# Patient Record
Sex: MALE | Race: WHITE | URBAN - METROPOLITAN AREA
[De-identification: names, ages, dates, MRNs, and addresses within clinical notes are randomized per-mention and may not be internally consistent; named-entity substitution may affect disease eponyms.]

---

## 2023-10-25 ENCOUNTER — TELEPHONE (OUTPATIENT)
Dept: CARDIOTHORACIC SURGERY | Age: 58
End: 2023-10-25

## 2024-03-08 ENCOUNTER — OFFICE VISIT (OUTPATIENT)
Dept: VASCULAR SURGERY | Age: 59
End: 2024-03-08
Payer: COMMERCIAL

## 2024-03-08 VITALS — WEIGHT: 160 LBS | HEIGHT: 68 IN | BODY MASS INDEX: 24.25 KG/M2

## 2024-03-08 DIAGNOSIS — I83.891 SYMPTOMATIC VARICOSE VEINS OF RIGHT LOWER EXTREMITY: Primary | ICD-10-CM

## 2024-03-08 PROCEDURE — G8484 FLU IMMUNIZE NO ADMIN: HCPCS | Performed by: SURGERY

## 2024-03-08 PROCEDURE — 1036F TOBACCO NON-USER: CPT | Performed by: SURGERY

## 2024-03-08 PROCEDURE — 3017F COLORECTAL CA SCREEN DOC REV: CPT | Performed by: SURGERY

## 2024-03-08 PROCEDURE — G8420 CALC BMI NORM PARAMETERS: HCPCS | Performed by: SURGERY

## 2024-03-08 PROCEDURE — 99203 OFFICE O/P NEW LOW 30 MIN: CPT | Performed by: SURGERY

## 2024-03-08 PROCEDURE — G8427 DOCREV CUR MEDS BY ELIG CLIN: HCPCS | Performed by: SURGERY

## 2024-03-08 RX ORDER — LEVOTHYROXINE SODIUM 50 MCG
50 TABLET ORAL DAILY
COMMUNITY

## 2024-03-08 RX ORDER — LAMOTRIGINE 250 MG/1
2 TABLET, EXTENDED RELEASE ORAL DAILY
COMMUNITY

## 2024-03-08 ASSESSMENT — ENCOUNTER SYMPTOMS
BACK PAIN: 1
RESPIRATORY NEGATIVE: 1
CONSTIPATION: 1
EYES NEGATIVE: 1

## 2024-03-08 NOTE — PROGRESS NOTES
Subjective:      Patient ID: Ronald Owens is a 59 y.o. male.    HPI Referral from Joseph Pham MD for evaluation of enlarging right calf varicose veins associated with a discomfort described as a pressure sensation worse with standing and activity.  Is improved with elevation.  Patient wears a leg sleeve during activities to improve symptoms.  Varicosities have been present and worsening over the last 5 to 7 years.  No previous venous interventions.  No history of SVT, bleeding, ulceration, dermatitis or swelling.    Past Medical History:   Diagnosis Date    Anemia, iron deficiency 3/26/2014    Hgb 13.4 (normal 13.5-17.5) on 10/30/13 labs. Subclinical, no sx.    Hypothyroidism 3/26/2014    TSH 5.37 (normal 0.35-5.5) on 10/30/13. Subclinical, no sx.    Nevus 10/30/2013     1 x 1.5 cm L scapula 10/30/13, noted 2010. Evenly pigmented, minor shadow 7:30 off of square shaped lesion.    Osteoporosis     Seizure (HCC)     Dr. Hudson An     Past Surgical History:   Procedure Laterality Date    PRE-MALIGNANT / BENIGN SKIN LESION EXCISION  3/15/02    abd and 2nd right toe    VASECTOMY  2000    WISDOM TOOTH EXTRACTION      20 years old     No Known Allergies  Current Outpatient Medications   Medication Sig Dispense Refill    Cholecalciferol ([facility] VIT D 5000 HIGH-POTENCY) 5000 UNITS CAPS capsule All patients should take 5000 IU or more Vitamin D every day for good general health. There are myriad benefits to higher Vitamin D levels. 90 capsule 12    Calcium Citrate-Vitamin D (CALCIUM CITRATE + D3 PO) Take 1,900 mg by mouth 2 times daily. Vit D 1500 IU      lamoTRIgine 250 MG TB24 Take 2 tablets by mouth daily      SYNTHROID 50 MCG tablet Take 1 tablet by mouth Daily       No current facility-administered medications for this visit.     Social History     Socioeconomic History    Marital status:      Spouse name: Isis Owens BSN, RN    Number of children: 4    Years of education: 18    Highest education level: Master's

## 2024-08-07 ENCOUNTER — TELEPHONE (OUTPATIENT)
Dept: VASCULAR SURGERY | Age: 59
End: 2024-08-07

## 2024-08-12 NOTE — TELEPHONE ENCOUNTER
Left message for patient to call the office   Patient needs to have scan and OV to discuss test results. No surgery has been discussed.

## 2024-08-30 DIAGNOSIS — I83.891 SYMPTOMATIC VARICOSE VEINS OF RIGHT LOWER EXTREMITY: Primary | ICD-10-CM

## 2024-09-06 ENCOUNTER — HOSPITAL ENCOUNTER (OUTPATIENT)
Dept: VASCULAR LAB | Age: 59
Discharge: HOME | End: 2024-09-08
Attending: SURGERY
Payer: COMMERCIAL

## 2024-09-06 DIAGNOSIS — I83.891 SYMPTOMATIC VARICOSE VEINS OF RIGHT LOWER EXTREMITY: ICD-10-CM

## 2024-09-06 LAB
VAS RIGHT AASV PROX DIAM: 7 MM
VAS RIGHT AASV PRX RFX: 2.1 S
VAS RIGHT CFV RFX: 0.7 S
VAS RIGHT FV MID RFX: 0.6 S
VAS RIGHT GSV AK RFX: 0.4 S
VAS RIGHT GSV ANKLE DIAM: 3 MM
VAS RIGHT GSV ANKLE RFX: 0 S
VAS RIGHT GSV AT KNEE DIAM: 1.5 MM
VAS RIGHT GSV BK DIST DIAM: 2.3 MM
VAS RIGHT GSV BK DIST RFX: 0 S
VAS RIGHT GSV BK MID DIAM: 2.6 MM
VAS RIGHT GSV BK MID RFX: 0 S
VAS RIGHT GSV BK PROX DIAM: 1.8 MM
VAS RIGHT GSV BK PROX RFX: 0 S
VAS RIGHT GSV JUNC DIAM: 7 MM
VAS RIGHT GSV JUNC RFX: 3.9 S
VAS RIGHT GSV THIGH DIST DIAM: 2.3 MM
VAS RIGHT GSV THIGH DIST RFX: 0 S
VAS RIGHT GSV THIGH MID DIAM: 5.6 MM
VAS RIGHT GSV THIGH MID RFX: 2.3 S
VAS RIGHT GSV THIGH PROX DIAM: 6.3 MM
VAS RIGHT GSV THIGH PROX RFX: 2 S
VAS RIGHT PERFORATOR DIAM: 3.7 MM
VAS RIGHT PERFORATOR RFX: 4.2 S
VAS RIGHT PFV RFX: 4.6 S
VAS RIGHT POP RFX: 1.1 S
VAS RIGHT SSV DIST DIAM: 4.5 MM
VAS RIGHT SSV DIST RFX: 1.9 S
VAS RIGHT SSV MID DIAM: 3.3 MM
VAS RIGHT SSV MID RFX: 1.3 S
VAS RIGHT SSV PROX DIAM: 2.7 MM
VAS RIGHT SSV PROX RFX: 0 S

## 2024-09-06 PROCEDURE — 93971 EXTREMITY STUDY: CPT | Performed by: SURGERY

## 2024-09-06 PROCEDURE — 93971 EXTREMITY STUDY: CPT

## 2024-10-03 ENCOUNTER — OFFICE VISIT (OUTPATIENT)
Dept: VASCULAR SURGERY | Age: 59
End: 2024-10-03
Payer: COMMERCIAL

## 2024-10-03 VITALS
OXYGEN SATURATION: 97 % | WEIGHT: 162.6 LBS | SYSTOLIC BLOOD PRESSURE: 100 MMHG | HEART RATE: 73 BPM | DIASTOLIC BLOOD PRESSURE: 64 MMHG | BODY MASS INDEX: 24.72 KG/M2

## 2024-10-03 DIAGNOSIS — I83.891 SYMPTOMATIC VARICOSE VEINS OF RIGHT LOWER EXTREMITY: Primary | ICD-10-CM

## 2024-10-03 PROCEDURE — G8484 FLU IMMUNIZE NO ADMIN: HCPCS | Performed by: SURGERY

## 2024-10-03 PROCEDURE — 99213 OFFICE O/P EST LOW 20 MIN: CPT | Performed by: SURGERY

## 2024-10-03 PROCEDURE — 1036F TOBACCO NON-USER: CPT | Performed by: SURGERY

## 2024-10-03 PROCEDURE — G8427 DOCREV CUR MEDS BY ELIG CLIN: HCPCS | Performed by: SURGERY

## 2024-10-03 PROCEDURE — G8420 CALC BMI NORM PARAMETERS: HCPCS | Performed by: SURGERY

## 2024-10-03 PROCEDURE — 3017F COLORECTAL CA SCREEN DOC REV: CPT | Performed by: SURGERY

## 2024-10-03 NOTE — PROGRESS NOTES
Seen back for symptomatic varicose veins R leg.  Pt has been wearing the prescribed KH stockings with some benefit as decreased discomfort but feels increasing tightness and discomfort progressively through the course of the day.  No reported edema, bleeding, tender cord, skin changes or ulceration.  Recent venous reflux scan performed on 9/6/2024 at .    EXAM:  No edema, ulceration or dermatitis.    All VVs soft, nontender without erythema.     VRS - trace deep reflux; R GSV and LSV reflux    A/P: Chronic superficial venous insufficiency R leg with secondary symptomatic varicose veins   SIGNIFICANT R AXIAL REFLUX with LARGE VARICOSITIES.   Surgery is recommended - R GSV RFA + R LSV RFA + stab phlebectomies.   This was discussed in terms that the patient could understand.   The risks (bleeding, clotting, bruising, swelling, neuritis, skin dimpling, infection, pigmentation, scarring) and mortality were discussed.   I answered all questions pertaining to surgery and post operative expectations related to return to work and daily activity.   Time spent counseling and coordination of care: 25 minutes.  More than 50% of visit was spent reviewing and discussing venous duplex scan.  He will continue compression stockings and schedule as recommended if desired.

## 2024-10-08 ENCOUNTER — TELEPHONE (OUTPATIENT)
Dept: VASCULAR SURGERY | Age: 59
End: 2024-10-08

## 2024-10-08 NOTE — TELEPHONE ENCOUNTER
Please call patient to schedule:    R GSV RFA + R LSV RFA + stab phlebectomies. CPT (73510, 58761, 07950)  ICD 10: I83.811    Approved Auth# [id number]  Good 10/21/24-1/19/25    Hospital: [facility]  Hours: 2  Position: Prone  Stab fee: $1200    Post Op Scan & OV needed

## 2024-10-10 ENCOUNTER — TELEPHONE (OUTPATIENT)
Dept: VASCULAR SURGERY | Age: 59
End: 2024-10-10

## 2024-10-10 NOTE — TELEPHONE ENCOUNTER
Called patient regarding scheduling his varicose vein surgery. Left him a message to possible schedule for November 4, 2024. Jose   
Patient

## 2024-10-11 ENCOUNTER — TELEPHONE (OUTPATIENT)
Dept: VASCULAR SURGERY | Age: 59
End: 2024-10-11

## 2024-10-14 ENCOUNTER — PREP FOR PROCEDURE (OUTPATIENT)
Dept: VASCULAR SURGERY | Age: 59
End: 2024-10-14

## 2024-10-14 ENCOUNTER — TELEPHONE (OUTPATIENT)
Dept: VASCULAR SURGERY | Age: 59
End: 2024-10-14

## 2024-10-14 DIAGNOSIS — I83.891 SYMPTOMATIC VARICOSE VEINS, RIGHT: ICD-10-CM

## 2024-10-14 DIAGNOSIS — Z01.818 PREOP TESTING: Primary | ICD-10-CM

## 2024-10-14 NOTE — TELEPHONE ENCOUNTER
Patient returned my call regarding scheduling his vein surgery. I ask him if could go to  and he was okay with that. He is off starting Dec 13 and would like to schedule his vein surgery. [facility] has time and Dr Lechuga has block on Tuesday December 17 at 8:00am and patient to arrive at 6:00am. Npo after midnight. Postop of scan scheduled Friday December 20, at 8:30am for scan and visit at 9:30am. Patient told to get his H&P.JONNY

## 2024-10-15 ENCOUNTER — TELEPHONE (OUTPATIENT)
Dept: VASCULAR SURGERY | Age: 59
End: 2024-10-15

## 2024-10-15 NOTE — TELEPHONE ENCOUNTER
Had to move patients postop vds to Thursday Dec 19 at 12:30pm and postop apt to see Alexandrea to follow. Pamlr

## 2024-10-15 NOTE — TELEPHONE ENCOUNTER
Patient called to r/s his surgery with Dr Lechuga:  MW - 1/28/25 at 8am arrive 6:30am  Advised to get labs drawn prior to and see PCP for H&P prior to surgery.  Postop scan 1/31 at 8:30am  Postop OV 1/31 at 9:30am  All information discussed

## 2024-10-17 RX ORDER — SODIUM CHLORIDE 0.9 % (FLUSH) 0.9 %
5-40 SYRINGE (ML) INJECTION EVERY 12 HOURS SCHEDULED
OUTPATIENT
Start: 2024-10-17

## 2024-10-17 RX ORDER — SODIUM CHLORIDE 0.9 % (FLUSH) 0.9 %
5-40 SYRINGE (ML) INJECTION PRN
OUTPATIENT
Start: 2024-10-17

## 2024-10-17 RX ORDER — SODIUM CHLORIDE 9 MG/ML
INJECTION, SOLUTION INTRAVENOUS PRN
OUTPATIENT
Start: 2024-10-17

## 2024-10-30 ENCOUNTER — TELEPHONE (OUTPATIENT)
Dept: VASCULAR SURGERY | Age: 59
End: 2024-10-30

## 2024-10-30 NOTE — TELEPHONE ENCOUNTER
Patient called and is requesting to change his surgery date from 1/28/25 to after February 2nd. Please call to reschedule.

## 2024-10-30 NOTE — TELEPHONE ENCOUNTER
Patient called and wants to reschedule his sx with Dr Lechuga on 1/28/25. Call back number is [phone].

## 2024-11-01 ENCOUNTER — TELEPHONE (OUTPATIENT)
Dept: VASCULAR SURGERY | Age: 59
End: 2024-11-01

## 2024-11-01 NOTE — TELEPHONE ENCOUNTER
Checking with Dr Lechuga to see if his surgery is r/s to 2/4, can he come in 2/6 for post op? Will call back once he gets back to me.

## 2024-12-27 ENCOUNTER — PREP FOR PROCEDURE (OUTPATIENT)
Dept: VASCULAR SURGERY | Age: 59
End: 2024-12-27

## 2025-01-15 ENCOUNTER — TELEPHONE (OUTPATIENT)
Dept: VASCULAR SURGERY | Age: 60
End: 2025-01-15

## 2025-01-15 NOTE — TELEPHONE ENCOUNTER
Mr. Owens called needing to reschedule his surgery that is currently on 2/4 d/t work commitment he can not rearrange. Please call to reschedule. [phone]

## 2025-02-19 ENCOUNTER — TELEPHONE (OUTPATIENT)
Dept: VASCULAR SURGERY | Age: 60
End: 2025-02-19

## 2025-02-19 NOTE — TELEPHONE ENCOUNTER
Pt requesting Advise what Compression Stocking Size should he buy.    Last Seen 10/3/24 Dr. Lechuga patient.

## 2025-02-19 NOTE — TELEPHONE ENCOUNTER
Called patient and gave him the [facility] website to get his calf measurement off there since his last measurement was not in his chart.  He stated he liked these compression socks better than the tradition stockings.Gave him instructions on how and where to measure on his calf.      Patient voiced understanding.

## 2025-03-06 NOTE — TELEPHONE ENCOUNTER
Left message for patient to call back. I would not be able to determine surgical cost without knowing what procedure would be preformed. Recovery time depends per individua; however its usually 1 week off work after surgery depending on the job duties. Never smoker

## 2025-07-31 DIAGNOSIS — I83.891 SYMPTOMATIC VARICOSE VEINS, RIGHT: Primary | ICD-10-CM

## 2025-08-06 ENCOUNTER — OFFICE VISIT (OUTPATIENT)
Dept: VASCULAR SURGERY | Age: 60
End: 2025-08-06
Payer: COMMERCIAL

## 2025-08-06 VITALS
BODY MASS INDEX: 24.55 KG/M2 | HEIGHT: 68 IN | WEIGHT: 162 LBS | DIASTOLIC BLOOD PRESSURE: 76 MMHG | HEART RATE: 60 BPM | SYSTOLIC BLOOD PRESSURE: 118 MMHG | OXYGEN SATURATION: 97 %

## 2025-08-06 DIAGNOSIS — I83.891 SYMPTOMATIC VARICOSE VEINS, RIGHT: Primary | ICD-10-CM

## 2025-08-06 PROCEDURE — 99213 OFFICE O/P EST LOW 20 MIN: CPT | Performed by: SURGERY

## 2025-08-08 ENCOUNTER — TELEPHONE (OUTPATIENT)
Dept: VASCULAR SURGERY | Age: 60
End: 2025-08-08

## 2025-08-28 ENCOUNTER — TELEPHONE (OUTPATIENT)
Dept: VASCULAR SURGERY | Age: 60
End: 2025-08-28

## 2025-08-29 PROBLEM — I83.891 SYMPTOMATIC VARICOSE VEINS OF RIGHT LOWER EXTREMITY: Status: ACTIVE | Noted: 2025-08-29
